# Patient Record
Sex: FEMALE | Race: WHITE | HISPANIC OR LATINO | URBAN - METROPOLITAN AREA
[De-identification: names, ages, dates, MRNs, and addresses within clinical notes are randomized per-mention and may not be internally consistent; named-entity substitution may affect disease eponyms.]

---

## 2017-01-08 ENCOUNTER — HOSPITAL ENCOUNTER (EMERGENCY)
Facility: HOSPITAL | Age: 47
Discharge: HOME | End: 2017-01-08
Attending: EMERGENCY MEDICINE

## 2017-01-08 VITALS
WEIGHT: 165.81 LBS | BODY MASS INDEX: 31.3 KG/M2 | TEMPERATURE: 98 F | OXYGEN SATURATION: 98 % | RESPIRATION RATE: 18 BRPM | HEART RATE: 79 BPM | HEIGHT: 61 IN | SYSTOLIC BLOOD PRESSURE: 119 MMHG | DIASTOLIC BLOOD PRESSURE: 71 MMHG

## 2017-01-08 DIAGNOSIS — R50.9 ACUTE FEBRILE ILLNESS: Primary | ICD-10-CM

## 2017-01-08 DIAGNOSIS — N39.0 URINARY TRACT INFECTION WITH HEMATURIA, SITE UNSPECIFIED: ICD-10-CM

## 2017-01-08 DIAGNOSIS — R31.9 URINARY TRACT INFECTION WITH HEMATURIA, SITE UNSPECIFIED: ICD-10-CM

## 2017-01-08 DIAGNOSIS — E86.0 DEHYDRATION, MILD: ICD-10-CM

## 2017-01-08 DIAGNOSIS — R51.9 NONINTRACTABLE HEADACHE, UNSPECIFIED CHRONICITY PATTERN, UNSPECIFIED HEADACHE TYPE: ICD-10-CM

## 2017-01-08 LAB
ALBUMIN SERPL BCP-MCNC: 3.5 G/DL
ALP SERPL-CCNC: 132 U/L
ALT SERPL W/O P-5'-P-CCNC: 49 U/L
ANION GAP SERPL CALC-SCNC: 11 MMOL/L
AST SERPL-CCNC: 49 U/L
BACTERIA #/AREA URNS HPF: ABNORMAL /HPF
BASOPHILS # BLD AUTO: 0.01 K/UL
BASOPHILS NFR BLD: 0.1 %
BILIRUB SERPL-MCNC: 0.6 MG/DL
BILIRUB UR QL STRIP: NEGATIVE
BUN SERPL-MCNC: 9 MG/DL
CALCIUM SERPL-MCNC: 9.2 MG/DL
CHLORIDE SERPL-SCNC: 104 MMOL/L
CLARITY UR: ABNORMAL
CO2 SERPL-SCNC: 21 MMOL/L
COLOR UR: ABNORMAL
CREAT SERPL-MCNC: 0.8 MG/DL
DEPRECATED S PYO AG THROAT QL EIA: NEGATIVE
DIFFERENTIAL METHOD: ABNORMAL
EOSINOPHIL # BLD AUTO: 0 K/UL
EOSINOPHIL NFR BLD: 0 %
ERYTHROCYTE [DISTWIDTH] IN BLOOD BY AUTOMATED COUNT: 14.9 %
EST. GFR  (AFRICAN AMERICAN): >60 ML/MIN/1.73 M^2
EST. GFR  (NON AFRICAN AMERICAN): >60 ML/MIN/1.73 M^2
FLUAV AG SPEC QL IA: NEGATIVE
FLUBV AG SPEC QL IA: NEGATIVE
GLUCOSE SERPL-MCNC: 137 MG/DL
GLUCOSE UR QL STRIP: NEGATIVE
HCT VFR BLD AUTO: 34.9 %
HGB BLD-MCNC: 11.2 G/DL
HGB UR QL STRIP: ABNORMAL
KETONES UR QL STRIP: ABNORMAL
LEUKOCYTE ESTERASE UR QL STRIP: ABNORMAL
LYMPHOCYTES # BLD AUTO: 1.2 K/UL
LYMPHOCYTES NFR BLD: 13.1 %
MCH RBC QN AUTO: 22.8 PG
MCHC RBC AUTO-ENTMCNC: 32.1 %
MCV RBC AUTO: 71 FL
MICROSCOPIC COMMENT: ABNORMAL
MONOCYTES # BLD AUTO: 0.7 K/UL
MONOCYTES NFR BLD: 7.6 %
NEUTROPHILS # BLD AUTO: 7 K/UL
NEUTROPHILS NFR BLD: 79.2 %
NITRITE UR QL STRIP: POSITIVE
PH UR STRIP: 6 [PH] (ref 5–8)
PLATELET # BLD AUTO: 171 K/UL
PMV BLD AUTO: 11 FL
POTASSIUM SERPL-SCNC: 3.5 MMOL/L
PROT SERPL-MCNC: 7.5 G/DL
PROT UR QL STRIP: ABNORMAL
RBC # BLD AUTO: 4.91 M/UL
RBC #/AREA URNS HPF: 5 /HPF (ref 0–4)
SODIUM SERPL-SCNC: 136 MMOL/L
SP GR UR STRIP: >=1.03 (ref 1–1.03)
SPECIMEN SOURCE: NORMAL
URN SPEC COLLECT METH UR: ABNORMAL
UROBILINOGEN UR STRIP-ACNC: NEGATIVE EU/DL
WBC # BLD AUTO: 8.88 K/UL
WBC #/AREA URNS HPF: 20 /HPF (ref 0–5)

## 2017-01-08 PROCEDURE — 87880 STREP A ASSAY W/OPTIC: CPT

## 2017-01-08 PROCEDURE — 87086 URINE CULTURE/COLONY COUNT: CPT

## 2017-01-08 PROCEDURE — 85025 COMPLETE CBC W/AUTO DIFF WBC: CPT

## 2017-01-08 PROCEDURE — 87077 CULTURE AEROBIC IDENTIFY: CPT

## 2017-01-08 PROCEDURE — 87186 SC STD MICRODIL/AGAR DIL: CPT

## 2017-01-08 PROCEDURE — 81000 URINALYSIS NONAUTO W/SCOPE: CPT

## 2017-01-08 PROCEDURE — 80053 COMPREHEN METABOLIC PANEL: CPT

## 2017-01-08 PROCEDURE — 87400 INFLUENZA A/B EACH AG IA: CPT

## 2017-01-08 PROCEDURE — 96375 TX/PRO/DX INJ NEW DRUG ADDON: CPT

## 2017-01-08 PROCEDURE — 87088 URINE BACTERIA CULTURE: CPT

## 2017-01-08 PROCEDURE — 99284 EMERGENCY DEPT VISIT MOD MDM: CPT | Mod: 25

## 2017-01-08 PROCEDURE — 87081 CULTURE SCREEN ONLY: CPT

## 2017-01-08 PROCEDURE — 25000003 PHARM REV CODE 250: Performed by: EMERGENCY MEDICINE

## 2017-01-08 PROCEDURE — 63600175 PHARM REV CODE 636 W HCPCS: Performed by: EMERGENCY MEDICINE

## 2017-01-08 PROCEDURE — 96365 THER/PROPH/DIAG IV INF INIT: CPT

## 2017-01-08 PROCEDURE — 96361 HYDRATE IV INFUSION ADD-ON: CPT

## 2017-01-08 RX ORDER — CIPROFLOXACIN 500 MG/1
500 TABLET ORAL 2 TIMES DAILY
Qty: 14 TABLET | Refills: 0 | Status: SHIPPED | OUTPATIENT
Start: 2017-01-08 | End: 2017-01-15

## 2017-01-08 RX ORDER — IBUPROFEN 600 MG/1
600 TABLET ORAL
Status: COMPLETED | OUTPATIENT
Start: 2017-01-08 | End: 2017-01-08

## 2017-01-08 RX ORDER — CIPROFLOXACIN 500 MG/1
500 TABLET ORAL
Status: DISCONTINUED | OUTPATIENT
Start: 2017-01-08 | End: 2017-01-08 | Stop reason: HOSPADM

## 2017-01-08 RX ORDER — LORAZEPAM 2 MG/ML
1 INJECTION INTRAMUSCULAR
Status: DISCONTINUED | OUTPATIENT
Start: 2017-01-08 | End: 2017-01-08

## 2017-01-08 RX ORDER — BUTALBITAL, ACETAMINOPHEN AND CAFFEINE 50; 325; 40 MG/1; MG/1; MG/1
1 TABLET ORAL EVERY 4 HOURS PRN
Qty: 20 TABLET | Refills: 0 | Status: SHIPPED | OUTPATIENT
Start: 2017-01-08 | End: 2017-02-07

## 2017-01-08 RX ORDER — BUTALBITAL, ACETAMINOPHEN AND CAFFEINE 50; 325; 40 MG/1; MG/1; MG/1
2 TABLET ORAL
Status: COMPLETED | OUTPATIENT
Start: 2017-01-08 | End: 2017-01-08

## 2017-01-08 RX ORDER — DEXAMETHASONE SODIUM PHOSPHATE 4 MG/ML
8 INJECTION, SOLUTION INTRA-ARTICULAR; INTRALESIONAL; INTRAMUSCULAR; INTRAVENOUS; SOFT TISSUE
Status: COMPLETED | OUTPATIENT
Start: 2017-01-08 | End: 2017-01-08

## 2017-01-08 RX ORDER — DIPHENHYDRAMINE HYDROCHLORIDE 50 MG/ML
25 INJECTION INTRAMUSCULAR; INTRAVENOUS
Status: DISCONTINUED | OUTPATIENT
Start: 2017-01-08 | End: 2017-01-08

## 2017-01-08 RX ADMIN — BUTALBITAL, ACETAMINOPHEN, AND CAFFEINE 2 TABLET: 50; 325; 40 TABLET ORAL at 08:01

## 2017-01-08 RX ADMIN — DEXAMETHASONE SODIUM PHOSPHATE 8 MG: 4 INJECTION, SOLUTION INTRAMUSCULAR; INTRAVENOUS at 10:01

## 2017-01-08 RX ADMIN — SODIUM CHLORIDE 1000 ML: 0.9 INJECTION, SOLUTION INTRAVENOUS at 11:01

## 2017-01-08 RX ADMIN — IBUPROFEN 600 MG: 600 TABLET, FILM COATED ORAL at 08:01

## 2017-01-08 RX ADMIN — PROMETHAZINE HYDROCHLORIDE 25 MG: 25 INJECTION INTRAMUSCULAR; INTRAVENOUS at 10:01

## 2017-01-08 RX ADMIN — SODIUM CHLORIDE 1000 ML: 0.9 INJECTION, SOLUTION INTRAVENOUS at 10:01

## 2017-01-08 NOTE — ED PROVIDER NOTES
Encounter Date: 1/8/2017       History     Chief Complaint   Patient presents with    Headache     for 3 days    Fever    Vomiting     Review of patient's allergies indicates:  No Known Allergies  HPI Comments: 46F presents with HA x 3 days.  She reports a severe pounding HA in her entire head x 3 days.  Associated body aches.  Yesterday she vomited once.  She thinks she has been having fever, never checked it, but has been taking motrin 400mg every 6 hours since her symptoms began.  She denies associated ear pain, sore throat, cough, abd pain or diarrhea.  HA is rated 6/10 at this time.    The history is provided by the patient.     History reviewed. No pertinent past medical history.  No past medical history pertinent negatives.  History reviewed. No pertinent past surgical history.  History reviewed. No pertinent family history.  Social History   Substance Use Topics    Smoking status: Never Smoker    Smokeless tobacco: None    Alcohol use No     Review of Systems   Constitutional: Positive for fever (subjective fever).   HENT: Negative for ear pain and sore throat.    Respiratory: Negative for cough.    Gastrointestinal: Positive for vomiting. Negative for abdominal pain and diarrhea.   Musculoskeletal: Positive for arthralgias, back pain and myalgias.   Neurological: Positive for headaches.   All other systems reviewed and are negative.      Physical Exam   Initial Vitals   BP Pulse Resp Temp SpO2   01/08/17 0803 01/08/17 0803 01/08/17 0803 01/08/17 0803 01/08/17 0803   128/60 107 18 100.6 °F (38.1 °C) 98 %     Physical Exam    Nursing note and vitals reviewed.  Constitutional: She appears well-developed and well-nourished.  Non-toxic appearance. She does not have a sickly appearance. She does not appear ill. No distress.   HENT:   Head: Normocephalic and atraumatic.   Right Ear: Tympanic membrane normal.   Left Ear: Tympanic membrane normal.   Mouth/Throat: Oropharynx is clear and moist.   Eyes:  Conjunctivae are normal. Pupils are equal, round, and reactive to light.   Neck: Normal range of motion.   Cardiovascular: Tachycardia present.    No murmur heard.  Pulmonary/Chest: Breath sounds normal. She has no wheezes. She has no rhonchi. She has no rales.   Abdominal: Bowel sounds are normal. She exhibits no distension.   Musculoskeletal: Normal range of motion.   Neurological: She is alert. She has normal strength.   Skin: Skin is warm and dry. No rash noted.   Psychiatric: She has a normal mood and affect. Her behavior is normal.         ED Course   Procedures  Labs Reviewed   CULTURE, URINE   THROAT SCREEN, RAPID   INFLUENZA A AND B ANTIGEN   CBC W/ AUTO DIFFERENTIAL   COMPREHENSIVE METABOLIC PANEL   URINALYSIS   POCT URINE PREGNANCY          X-Rays:   Independently Interpreted Readings:   Other Readings:  CXR - no infiltrate    Medical Decision Making:   Independently Interpreted Test(s):   I have ordered and independently interpreted X-rays - see prior notes.  Clinical Tests:   Lab Tests: Ordered and Reviewed  Radiological Study: Ordered and Reviewed  ED Management:  46F with HA, body aches, subjective fever and 1 episode n/v.  Low grade fever in ER.  Does not look toxic.  No signs of Om or exudative pharyngitis on exam.  No meningeal signs or rash.      Labs with negative flu and strep.  No infiltrate on CXR.  Normal CBC.  UA shows nitrite positive UTI and 2+ ketones.    She was given fioricet and motrin initially for her HA with little relief.  She was then given IVFs for dehydration and IV steroids and phenergan.  She reported HILL was improved with this regimen and refused further meds for her HA since it was resolved.    I attribute HA to fever and dehydration.  Will give rx fioricet for HA.  Cipro for UTI.  Follow up with PCP as needed.                   ED Course     Clinical Impression:   The primary encounter diagnosis was Acute febrile illness. Diagnoses of Urinary tract infection with hematuria,  site unspecified, Nonintractable headache, unspecified chronicity pattern, unspecified headache type, and Dehydration, mild were also pertinent to this visit.          Esther Hager MD  01/08/17 6815

## 2017-01-08 NOTE — ED NOTES
Pt is Somali speaking only,  on the phone at bedside for Dr. Hager's exam. Pt stated that she has had a throbbing generalized headache for a couple of days, and reports that the over the counter medication that she used is not helping. Pt denies any other symptoms. Pt denies any other medical history.   Patient identifiers verified and correct for Guera Ramirez.    LOC: The patient is awake, alert and aware of environment with an appropriate affect, the patient is oriented x 3 and speaking appropriately.  APPEARANCE: Patient resting comfortably and in no acute distress, patient is clean and well groomed, patient's clothing is properly fastened.  SKIN: The skin is warm and dry, color consistent with ethnicity, patient has normal skin turgor and moist mucus membranes, skin intact, no breakdown or bruising noted.  MUSCULOSKELETAL: Patient moving all extremities spontaneously, no obvious swelling or deformities noted.  RESPIRATORY: Airway is open and patent, respirations are spontaneous, patient has a normal effort and rate, no accessory muscle use noted, bilateral breath sounds clear.  CARDIAC: Patient has a normal rate and regular rhythm, no periphreal edema noted, capillary refill < 3 seconds.  ABDOMEN: Soft and non tender to palpation, no distention noted, normoactive bowel sounds present in all four quadrants.  NEUROLOGIC: PERRL, 3mm bilaterally, eyes open spontaneously, behavior appropriate to situation, follows commands, facial expression symmetrical, bilateral hand grasp equal and even, purposeful motor response noted, normal sensation in all extremities when touched with a finger.

## 2017-01-08 NOTE — ED AVS SNAPSHOT
[facility]  [address]  [place] [address]               Guera Ramirez   2017  8:07 AM   ED    Descripción:  Female : 1970   Departamento:  [facility]           Jorgensen Cuidado fue coordinado por:     Provider Role From To    Esther Hager MD Attending Provider 17 --    XOCHITL Romo Nurse Practitioner 17      Razón de la atul     Headache     Fever     Vomiting           Diagnósticos de Esta Visita        Comentarios    Acute febrile illness    -  Primario     Urinary tract infection with hematuria, site unspecified         Nonintractable headache, unspecified chronicity pattern, unspecified headache type           ED Disposition     Ninguna           Lista de tareas           Información de seguimiento     Realice un seguimiento con:  [facility]    Especialidades:  Child and Adolescent Psychiatry, Psychology, Family Medicine, Obstetrics    Por qué:  As needed    Información de contacto:    [address]\[address]  [address]  [place] [address]  [phone]        Recetas para recoger        Disp Refills Start End    ciprofloxacin HCl (CIPRO) 500 MG tablet 14 tablet 0 2017 1/15/2017    Take 1 tablet (500 mg total) by mouth 2 (two) times daily. - Oral    butalbital-acetaminophen-caffeine -40 mg (FIORICET, ESGIC) -40 mg per tablet 20 tablet 0 2017    Take 1 tablet by mouth every 4 (four) hours as needed for Headaches. - Oral      [facility]     [facility] -  Assistance  Registered nurses in the [facility] provide clinical advisement, health education, appointment booking, and other advisory services.  Call for this free service at [phone].             Medicamentos           Mensaje sobre Medicamentos     Verify the changes and/or additions to your medication regime listed below are the same as  discussed with your clinician today.  If any of these changes or additions are incorrect, please notify your healthcare provider.        EMPEZAR a garry estos medicamentos NUEVOS        Refills    ciprofloxacin HCl (CIPRO) 500 MG tablet 0    Sig: Take 1 tablet (500 mg total) by mouth 2 (two) times daily.    Categoría: Print    Vía: Oral    butalbital-acetaminophen-caffeine -40 mg (FIORICET, ESGIC) -40 mg per tablet 0    Sig: Take 1 tablet by mouth every 4 (four) hours as needed for Headaches.    Categoría: Print    Vía: Oral      These medications were administered today        Dose Freq    ibuprofen tablet 600 mg 600 mg ED 1 Time    Sig: Take 1 tablet (600 mg total) by mouth ED 1 Time.    Categoría: Normal    Vía: Oral    butalbital-acetaminophen-caffeine -40 mg per tablet 2 tablet 2 tablet ED 1 Time    Sig: Take 2 tablets by mouth ED 1 Time.    Categoría: Normal    Vía: Oral    sodium chloride 0.9% bolus 1,000 mL 1,000 mL ED 1 Time    Sig: Inject 1,000 mLs into the vein ED 1 Time.    Categoría: Normal    Vía: Intravenous    promethazine (PHENERGAN) 25 mg in dextrose 5 % 50 mL IVPB 25 mg ED 1 Time    Sig: Inject 25 mg into the vein ED 1 Time.    Categoría: Normal    Vía: Intravenous    dexamethasone injection 8 mg 8 mg ED 1 Time    Sig: Inject 2 mLs (8 mg total) into the vein ED 1 Time.    Categoría: Normal    Vía: Intravenous    sodium chloride 0.9% bolus 1,000 mL 1,000 mL Once    Sig: Inject 1,000 mLs into the vein once.    Categoría: Normal    Vía: Intravenous    diphenhydrAMINE injection 25 mg 25 mg ED 1 Time    Sig: Inject 0.5 mLs (25 mg total) into the vein ED 1 Time.    Categoría: Normal    Vía: Intravenous    lorazepam injection 1 mg 1 mg ED 1 Time    Sig: Inject 0.5 mLs (1 mg total) into the vein ED 1 Time.    Categoría: Normal    Vía: Intravenous    ciprofloxacin HCl tablet 500 mg 500 mg ED 1 Time    Sig: Take 1 tablet (500 mg total) by mouth ED 1 Time.    Categoría: Normal    Vía: Oral  "          Verifique que la siguiente lista de medicamentos es america representación exacta de los medicamentos que está tomando actualmente. Si no hay ningunos reportados, la lista puede estar en leiva. Si no es correcta, por favor póngase en contacto con gupta proveedor de atención médica. Lleve esta lista con usted en anuradha de emergencia.           Medicamentos Actuales     butalbital-acetaminophen-caffeine -40 mg (FIORICET, ESGIC) -40 mg per tablet Take 1 tablet by mouth every 4 (four) hours as needed for Headaches.    ciprofloxacin HCl (CIPRO) 500 MG tablet Take 1 tablet (500 mg total) by mouth 2 (two) times daily.    ciprofloxacin HCl tablet 500 mg Take 1 tablet (500 mg total) by mouth ED 1 Time.    diphenhydrAMINE injection 25 mg Inject 0.5 mLs (25 mg total) into the vein ED 1 Time.    lorazepam injection 1 mg Inject 0.5 mLs (1 mg total) into the vein ED 1 Time.           Información de referencia clínica           Nessa signos vitales wing     PS Pulso Temperatura Resp C[place] Peso    119/71 79 98.3 °F (36.8 °C) 18 5' 1" (1.549 m) 75.2 kg (165 lb 12.6 oz)    Ultima menstruación SpO2 BMI ([facility])             12/12/2016 (Approximate) 98% 31.32 kg/m2         Alergias     A partir del:  1/8/2017        No Known Allergies      Vacunas     Administradas en la fecha de la visita:  1/8/2017        None      ED Micro, Lab, POCT     Start Ordered       Status Ordering Provider    01/08/17 1121 01/08/17 1120  Urine culture  Add-on      Completed     01/08/17 0947 01/08/17 0946  POCT urine pregnancy  Once      Acknowledged     01/08/17 0940 01/08/17 0939  CBC auto differential  STAT      Collected     01/08/17 0940 01/08/17 0939  Comprehensive metabolic panel  STAT      Collected     01/08/17 0940 01/08/17 0939  Urinalysis  STAT      Collected     01/08/17 0940 01/08/17 0939  Throat Screen, Rapid  STAT      Collected     01/08/17 0824 01/08/17 0823  Influenza antigen Nasopharyngeal Swab  STAT      Collected       ED " Imaging Orders     Start Ordered       Status Ordering Provider    01/08/17 0940 01/08/17 0939  X-Ray Chest PA And Lateral  1 time imaging      Final result         Instrucciones a trang de robin       Rest.  Drink plenty of fluids.  Take all of your prescribed antibiotic.  Take motrin 600mg every 6 hours for fever.  Take fioricet for headache.  Follow up with a primary care doctor this week.        Referencias/Adjuntos de robin     URINARY TRACT INFECTIONS IN WOMEN (Welsh)    HEADACHES, SELF-CARE FOR (Welsh)      Registrarse para [facility]     Activating your [facility] account is as easy as 1-2-3!     1) Visit [email or web address], select Sign Up Now, enter this activation code and your date of birth, then select Next.  [email or web address]  Expires: 2/22/2017  1:42 PM      2) Create a username and password to use when you visit [facility] in the future and select a security question in case you lose your password and select Next.    3) Enter your e-mail address and click Sign Up!    Additional Information  If you have questions, please e-mail [email or web address] or call [phone] to talk to our [facility] staff. Remember, [facility] is NOT to be used for urgent needs. For medical emergencies, dial 911.          [facility] complies with applicable Federal civil rights laws and does not discriminate on the basis of race, color, national origin, age, disability, or sex.        Language Assistance Services     ATTENTION: Language assistance services are available, free of charge. Please call [phone].      ATENCIÓN: Si habla español, tiene a gupta disposición servicios gratuitos de asistencia lingüística. Llame al [phone].     LIO Ý: N?u b?n nói Ti?ng Vi?t, có các d?ch v? h? tr? ngôn ng? mi?n phí dành cho b?n. G?i s? [phone].                    [facility] [address]  [place] [address]               Gueratrinity Ramirez   1/8/2017  8:07 AM   ED     Description:  Female : 1970   Department:  [facility]           Your Care was Coordinated By:     Provider Role From To    Esther Hager MD Attending Provider 17 --    XOCHITL Romo Nurse Practitioner 17      Reason for Visit     Headache     Fever     Vomiting           Diagnoses this Visit        Comments    Acute febrile illness    -  Primary     Urinary tract infection with hematuria, site unspecified         Nonintractable headache, unspecified chronicity pattern, unspecified headache type           ED Disposition     None           To Do List           Follow-up Information     Follow up with [facility].    Specialties:  Child and Adolescent Psychiatry, Psychology, Family Medicine, Obstetrics    Why:  As needed    Contact information:    [address]  [address]  [place] [address] [phone]         These Medications        Disp Refills Start End    ciprofloxacin HCl (CIPRO) 500 MG tablet 14 tablet 0 2017 1/15/2017    Take 1 tablet (500 mg total) by mouth 2 (two) times daily. - Oral    butalbital-acetaminophen-caffeine -40 mg (FIORICET, ESGIC) -40 mg per tablet 20 tablet 0 2017    Take 1 tablet by mouth every 4 (four) hours as needed for Headaches. - Oral      [facility]     [facility] -  Assistance  Registered nurses in the [facility] provide clinical advisement, health education, appointment booking, and other advisory services.  Call for this free service at [phone].             Medications           Message regarding Medications     Verify the changes and/or additions to your medication regime listed below are the same as discussed with your clinician today.  If any of these changes or additions are incorrect, please notify your healthcare provider.        START taking these NEW medications        Refills     ciprofloxacin HCl (CIPRO) 500 MG tablet 0    Sig: Take 1 tablet (500 mg total) by mouth 2 (two) times daily.    Class: Print    Route: Oral    butalbital-acetaminophen-caffeine -40 mg (FIORICET, ESGIC) -40 mg per tablet 0    Sig: Take 1 tablet by mouth every 4 (four) hours as needed for Headaches.    Class: Print    Route: Oral      These medications were administered today        Dose Freq    ibuprofen tablet 600 mg 600 mg ED 1 Time    Sig: Take 1 tablet (600 mg total) by mouth ED 1 Time.    Class: Normal    Route: Oral    butalbital-acetaminophen-caffeine -40 mg per tablet 2 tablet 2 tablet ED 1 Time    Sig: Take 2 tablets by mouth ED 1 Time.    Class: Normal    Route: Oral    sodium chloride 0.9% bolus 1,000 mL 1,000 mL ED 1 Time    Sig: Inject 1,000 mLs into the vein ED 1 Time.    Class: Normal    Route: Intravenous    promethazine (PHENERGAN) 25 mg in dextrose 5 % 50 mL IVPB 25 mg ED 1 Time    Sig: Inject 25 mg into the vein ED 1 Time.    Class: Normal    Route: Intravenous    dexamethasone injection 8 mg 8 mg ED 1 Time    Sig: Inject 2 mLs (8 mg total) into the vein ED 1 Time.    Class: Normal    Route: Intravenous    sodium chloride 0.9% bolus 1,000 mL 1,000 mL Once    Sig: Inject 1,000 mLs into the vein once.    Class: Normal    Route: Intravenous    diphenhydrAMINE injection 25 mg 25 mg ED 1 Time    Sig: Inject 0.5 mLs (25 mg total) into the vein ED 1 Time.    Class: Normal    Route: Intravenous    lorazepam injection 1 mg 1 mg ED 1 Time    Sig: Inject 0.5 mLs (1 mg total) into the vein ED 1 Time.    Class: Normal    Route: Intravenous    ciprofloxacin HCl tablet 500 mg 500 mg ED 1 Time    Sig: Take 1 tablet (500 mg total) by mouth ED 1 Time.    Class: Normal    Route: Oral           Verify that the below list of medications is an accurate representation of the medications you are currently taking.  If none reported, the list may be blank. If incorrect, please contact your healthcare  "provider. Carry this list with you in case of emergency.           Current Medications     butalbital-acetaminophen-caffeine -40 mg (FIORICET, ESGIC) -40 mg per tablet Take 1 tablet by mouth every 4 (four) hours as needed for Headaches.    ciprofloxacin HCl (CIPRO) 500 MG tablet Take 1 tablet (500 mg total) by mouth 2 (two) times daily.    ciprofloxacin HCl tablet 500 mg Take 1 tablet (500 mg total) by mouth ED 1 Time.    diphenhydrAMINE injection 25 mg Inject 0.5 mLs (25 mg total) into the vein ED 1 Time.    lorazepam injection 1 mg Inject 0.5 mLs (1 mg total) into the vein ED 1 Time.           Clinical Reference Information           Your Vitals Were     BP Pulse Temp Resp Height Weight    119/71 79 98.3 °F (36.8 °C) 18 5' 1" (1.549 m) 75.2 kg (165 lb 12.6 oz)    Last Period SpO2 BMI             12/12/2016 (Approximate) 98% 31.32 kg/m2         Allergies as of 1/8/2017     No Known Allergies      Immunizations Administered on Date of Encounter - 1/8/2017     None      ED Micro, Lab, POCT     Start Ordered       Status Ordering Provider    01/08/17 1121 01/08/17 1120  Urine culture  Add-on      Completed     01/08/17 0947 01/08/17 0946  POCT urine pregnancy  Once      Acknowledged     01/08/17 0940 01/08/17 0939  CBC auto differential  STAT      Collected     01/08/17 0940 01/08/17 0939  Comprehensive metabolic panel  STAT      Collected     01/08/17 0940 01/08/17 0939  Urinalysis  STAT      Collected     01/08/17 0940 01/08/17 0939  Throat Screen, Rapid  STAT      Collected     01/08/17 0824 01/08/17 0823  Influenza antigen Nasopharyngeal Swab  STAT      Collected       ED Imaging Orders     Start Ordered       Status Ordering Provider    01/08/17 0940 01/08/17 0939  X-Ray Chest PA And Lateral  1 time imaging      Final result         Discharge Instructions       Rest.  Drink plenty of fluids.  Take all of your prescribed antibiotic.  Take motrin 600mg every 6 hours for fever.  Take fioricet for " headache.  Follow up with a primary care doctor this week.        Discharge References/Attachments     URINARY TRACT INFECTIONS IN WOMEN (Nigerien)    HEADACHES, SELF-CARE FOR ([place]igerien)      [facility] Sign-Up     Activating your [facility] account is as easy as 1-2-3!     1) Visit [email or web address], select Sign Up Now, enter this activation code and your date of birth, then select Next.  [email or web address]  Expires: 2/22/2017  1:42 PM      2) Create a username and password to use when you visit [facility] in the future and select a security question in case you lose your password and select Next.    3) Enter your e-mail address and click Sign Up!    Additional Information  If you have questions, please e-mail [email or web address] or call [phone] to talk to our [facility] staff. Remember, [facility] is NOT to be used for urgent needs. For medical emergencies, dial 911.          [facility] complies with applicable Federal civil rights laws and does not discriminate on the basis of race, color, national origin, age, disability, or sex.        Language Assistance Services     ATTENTION: Language assistance services are available, free of charge. Please call [phone].      ATENCIÓN: Si habla christine, tiene a gupta disposición servicios gratuitos de asistencia lingüística. Llame al [phone].     LIO Ý: N?u b?n nói Ti?ng Vi?t, có các d?ch v? h? tr? ngôn ng? mi?n phí dành cho b?n. G?i s? [phone].

## 2017-01-08 NOTE — DISCHARGE INSTRUCTIONS
Rest.  Drink plenty of fluids.  Take all of your prescribed antibiotic.  Take motrin 600mg every 6 hours for fever.  Take fioricet for headache.  Follow up with a primary care doctor this week.

## 2017-01-10 LAB — BACTERIA UR CULT: NORMAL

## 2017-01-11 LAB — BACTERIA THROAT CULT: NORMAL
